# Patient Record
Sex: MALE
[De-identification: names, ages, dates, MRNs, and addresses within clinical notes are randomized per-mention and may not be internally consistent; named-entity substitution may affect disease eponyms.]

---

## 2022-04-06 ENCOUNTER — APPOINTMENT (OUTPATIENT)
Dept: ORTHOPEDIC SURGERY | Facility: CLINIC | Age: 87
End: 2022-04-06
Payer: MEDICARE

## 2022-04-06 VITALS — RESPIRATION RATE: 16 BRPM | WEIGHT: 180 LBS | HEIGHT: 70 IN | BODY MASS INDEX: 25.77 KG/M2

## 2022-04-06 DIAGNOSIS — Z87.39 PERSONAL HISTORY OF OTHER DISEASES OF THE MUSCULOSKELETAL SYSTEM AND CONNECTIVE TISSUE: ICD-10-CM

## 2022-04-06 DIAGNOSIS — Z78.9 OTHER SPECIFIED HEALTH STATUS: ICD-10-CM

## 2022-04-06 DIAGNOSIS — Z80.9 FAMILY HISTORY OF MALIGNANT NEOPLASM, UNSPECIFIED: ICD-10-CM

## 2022-04-06 PROBLEM — Z00.00 ENCOUNTER FOR PREVENTIVE HEALTH EXAMINATION: Status: ACTIVE | Noted: 2022-04-06

## 2022-04-06 PROCEDURE — 73130 X-RAY EXAM OF HAND: CPT | Mod: 50

## 2022-04-06 PROCEDURE — 20550 NJX 1 TENDON SHEATH/LIGAMENT: CPT | Mod: F7

## 2022-04-06 PROCEDURE — 99203 OFFICE O/P NEW LOW 30 MIN: CPT

## 2022-04-06 RX ORDER — LEVOTHYROXINE SODIUM 137 UG/1
TABLET ORAL
Refills: 0 | Status: ACTIVE | COMMUNITY

## 2022-04-06 RX ORDER — DIAZEPAM 5 MG/1
TABLET ORAL
Refills: 0 | Status: ACTIVE | COMMUNITY

## 2022-04-06 RX ORDER — FAMOTIDINE 10 MG/1
TABLET, FILM COATED ORAL
Refills: 0 | Status: ACTIVE | COMMUNITY

## 2022-04-06 RX ORDER — RABEPRAZOLE SODIUM 20 MG/1
TABLET, DELAYED RELEASE ORAL
Refills: 0 | Status: ACTIVE | COMMUNITY

## 2022-04-06 NOTE — PHYSICAL EXAM
[de-identified] : Physical exam shows the patient to be alert and oriented x3, capable of ambulation. The patient is well-developed and well-nourished in no apparent respiratory distress. Majority of the skin is intact bilaterally in the upper extremities without lymphadenopathy at the elbows.\par \par There is tenderness over the bilateral basal joint with a positive grind test and shoulder deformity. There is a positive crank test. There is swelling appreciated over the affected CMC joint which is not present on the opposite side. There is no evidence of infection or lymphadenopathy bilaterally to the level of the elbows There is no evidence of MCP hyperextension bilaterally. There is a negative Finkelstein's test There is no tenderness over the A-1 pulleys bilaterally except for the right third which has tenderness with mild locking upon compression.  No tenderness of the MP PIP or DIP joints.. 5/5 strength bilaterally. \par \par The wrists have a symmetric and full range of motion bilaterally with no pain upon forced flexion, extension, pronation and supination. There is no tenderness over the scaphoid scapholunate region ligaments and no tenderness of the TFCC bilaterally. There is no tenderness of the pisotriquetral hamate hook. The second through fifth CMC his is stable and nontender bilaterally.\par There is a negative carpal tunnel compression test or Tinel's bilaterally.   [de-identified] : PA lateral oblique of both hands shows advanced generative changes with sclerosis and osteophytes of the thumb CMC joints greater than DIP joint symmetric bilaterally

## 2022-04-06 NOTE — ASSESSMENT
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  \par \par The risks benefits and alternatives of treatment were discussed ranging from conservative to aggressive forms of treatment.  This included (but was not limited to) pain, infection, subcutaneous atrophy, skin depigmentation, tendon rupture, recurrence, incomplete relief of symptoms, tissue loss etc...  They agreed to undergo the steroid injection. \par \par Under informed consent and sterile conditions, 1/2 cc of 2% plain lidocaine  and 1/2 cc of Kenalog 10 was precisely injected into the patient's finger.   A sterile Band-Aid was placed and a home program was elected over occupational therapy. \par \par  It is my hope that this significantly alleviates the patient's symptoms. If symptoms are not fully resolved in the next 4-6 weeks they were encouraged to return to the office for reevaluation. Patient may also consider other forms of conservative care or surgical decompression which was discussed in the office today.\par \par  If symptoms are resolved they can followup on a as-needed basis.

## 2022-04-06 NOTE — CONSULT LETTER
[Dear  ___] : Dear  [unfilled], [Consult Letter:] : I had the pleasure of evaluating your patient, [unfilled]. [Please see my note below.] : Please see my note below. [Consult Closing:] : Thank you very much for allowing me to participate in the care of this patient.  If you have any questions, please do not hesitate to contact me. [Sincerely,] : Sincerely, [FreeTextEntry3] : Elías Eddy M.D., FAAOS\par Co-Director\par [facility]\par \par

## 2022-04-06 NOTE — HISTORY OF PRESENT ILLNESS
[Right] : right hand dominant [FreeTextEntry1] : Patient presents with right 3rd possible trigger finger. Patient complains of clicking of the finger for the last 10 days. He denies any injury.

## 2023-10-10 ENCOUNTER — APPOINTMENT (OUTPATIENT)
Dept: ORTHOPEDIC SURGERY | Facility: CLINIC | Age: 88
End: 2023-10-10
Payer: MEDICARE

## 2023-10-10 VITALS — RESPIRATION RATE: 16 BRPM | WEIGHT: 180 LBS | BODY MASS INDEX: 25.77 KG/M2 | HEIGHT: 70 IN

## 2023-10-10 DIAGNOSIS — M65.331 TRIGGER FINGER, RIGHT MIDDLE FINGER: ICD-10-CM

## 2023-10-10 PROCEDURE — 20550 NJX 1 TENDON SHEATH/LIGAMENT: CPT | Mod: LT

## 2023-10-10 PROCEDURE — 99214 OFFICE O/P EST MOD 30 MIN: CPT | Mod: 25

## 2023-11-28 VITALS — HEIGHT: 70 IN | BODY MASS INDEX: 25.77 KG/M2 | RESPIRATION RATE: 16 BRPM | WEIGHT: 180 LBS

## 2023-11-29 ENCOUNTER — APPOINTMENT (OUTPATIENT)
Dept: ORTHOPEDIC SURGERY | Facility: CLINIC | Age: 88
End: 2023-11-29
Payer: MEDICARE

## 2023-11-29 PROCEDURE — 99214 OFFICE O/P EST MOD 30 MIN: CPT | Mod: 25

## 2023-11-29 PROCEDURE — 20550 NJX 1 TENDON SHEATH/LIGAMENT: CPT | Mod: RT

## 2024-03-11 ENCOUNTER — APPOINTMENT (OUTPATIENT)
Dept: ORTHOPEDIC SURGERY | Facility: CLINIC | Age: 89
End: 2024-03-11
Payer: MEDICARE

## 2024-03-11 VITALS — RESPIRATION RATE: 16 BRPM | WEIGHT: 180 LBS | BODY MASS INDEX: 25.77 KG/M2 | HEIGHT: 70 IN

## 2024-03-11 DIAGNOSIS — M65.342 TRIGGER FINGER, LEFT RING FINGER: ICD-10-CM

## 2024-03-11 PROCEDURE — 20550 NJX 1 TENDON SHEATH/LIGAMENT: CPT | Mod: LT

## 2024-03-11 PROCEDURE — 99214 OFFICE O/P EST MOD 30 MIN: CPT | Mod: 25

## 2024-03-11 NOTE — HISTORY OF PRESENT ILLNESS
[Right] : right hand dominant [FreeTextEntry1] : Patient here for recurrent left fourth trigger finger despite 1 previous injection that lasted between 4 and 5 months.  Patient does not recall the injection but it is in the middle from October 2023.  Patient does not recall getting an injection into the left fourth despite it being documented in our records.

## 2024-03-11 NOTE — PHYSICAL EXAM
[de-identified] : No evidence of atrophy or skin changes from previous injections  There is positive swelling and tenderness localized to the A1 pulley of the left fourth digit with locking upon compression of the pulley.. There is no evidence of infection. No tenderness of the MP, PIP or DIP joint and no evidence of instability bilaterally. The FDS FDP and extensor mechanism is intact without instability and with 5 over 5 strength bilaterally in the digits.  There is good capillary refill of the digits bilaterally.There is no masses or sensitivity over the median and ulnar nerves at the level of the wrist. There is a negative Tinel's and negative Phalen's sign bilaterally. The sensation is grossly intact bilaterally.

## 2024-03-11 NOTE — ASSESSMENT
[FreeTextEntry1] : Left fourth trigger finger which I believe is recurrent after 1 previous injection but patient does not recall previous injection.  The risks, benefits, alternatives and associated differential diagnosis was discussed with the patient. Options ranged from conservative care, therapy to surgical intervention were reviewed and all questions answered. Risks included incomplete resolution of symptoms, worsening of symptoms recurrence, tissue loss, functional loss and other risks associated with treatment of this condition Patient appeared to have an excellent understanding of the risks as well as differential diagnosis associated with this condition.  Elected to proceed with a second injection for the left fourth trigger finger.  After the area was cleaned with alcohol to reduce the risk of infection, a half a cc of Kenalog 10-1/2 a cc of 2% lidocaine was placed into the region. Hemostasis was obtained by direct pressure and a Band-Aid applied.  Left fourth tendon sheath  Home program was elected over formal therapy  It is hoped that this has a long-lasting beneficial therapeutic effect. If symptoms are not fully resolved by 4-6 weeks the patient was asked to return to my office for reevaluation. If symptoms are resolved they can return on an as-needed basis.

## 2024-04-23 ENCOUNTER — APPOINTMENT (OUTPATIENT)
Dept: ORTHOPEDIC SURGERY | Facility: CLINIC | Age: 89
End: 2024-04-23
Payer: MEDICARE

## 2024-04-23 VITALS — WEIGHT: 180 LBS | RESPIRATION RATE: 16 BRPM | BODY MASS INDEX: 25.77 KG/M2 | HEIGHT: 70 IN

## 2024-04-23 DIAGNOSIS — M79.645 PAIN IN LEFT FINGER(S): ICD-10-CM

## 2024-04-23 DIAGNOSIS — M65.351 TRIGGER FINGER, RIGHT LITTLE FINGER: ICD-10-CM

## 2024-04-23 PROCEDURE — 20550 NJX 1 TENDON SHEATH/LIGAMENT: CPT | Mod: RT

## 2024-04-23 PROCEDURE — 99214 OFFICE O/P EST MOD 30 MIN: CPT | Mod: 25

## 2024-04-23 NOTE — PHYSICAL EXAM
[de-identified] : There is no atrophy or skin changes from previous injection  There is tenderness over the A1 pulley of the right fifth digit with locking upon compression no tenderness of the MP PIP or DIP joint full range of motion of the digit with active equaling passive range of motion.  There is good capillary refill of the digits bilaterally.There is no masses or sensitivity over the median and ulnar nerves at the level of the wrist. There is a negative Tinel's and negative Phalen's sign bilaterally. The sensation is grossly intact bilaterally.

## 2024-04-23 NOTE — ASSESSMENT
[FreeTextEntry1] : Patient has a recurrent right fifth trigger finger after 1 previous injection 1 year ago.  The risks, benefits, alternatives and associated differential diagnosis was discussed with the patient. Options ranged from conservative care, therapy to surgical intervention were reviewed and all questions answered. Risks included incomplete resolution of symptoms, worsening of symptoms recurrence, tissue loss, functional loss and other risks associated with treatment of this condition Patient appeared to have an excellent understanding of the risks as well as differential diagnosis associated with this condition.  He elected to proceed with an injection for the right fifth trigger finger  After the area was cleaned with alcohol to reduce the risk of infection, a half a cc of Kenalog 10-1/2 a cc of 2% lidocaine was placed into the region. Hemostasis was obtained by direct pressure and a Band-Aid applied.  Right fifth tendon sheath  It is hoped that this has a long-lasting beneficial therapeutic effect. If symptoms are not fully resolved by 4-6 weeks the patient was asked to return to my office for reevaluation. If symptoms are resolved they can return on an as-needed basis.

## 2024-04-23 NOTE — HISTORY OF PRESENT ILLNESS
[Right] : right hand dominant [FreeTextEntry1] : Patient returns for follow up of right small finger and left thumb pain worsening within the past few days.  No new trauma or fall.  Patient had a right fifth trigger finger injection on November 29, 2023.  Patient received a right third trigger finger injection on April 6, 2022.   He had a left fourth trigger finger with 2 previous injections. The first injection on 10/10/23 lasted between 4 and 5 months and the second injection on 3/11/24 still giving him relief.

## 2024-07-15 ENCOUNTER — APPOINTMENT (OUTPATIENT)
Dept: ORTHOPEDIC SURGERY | Facility: CLINIC | Age: 89
End: 2024-07-15

## 2024-07-15 VITALS — HEIGHT: 70 IN | WEIGHT: 180 LBS | BODY MASS INDEX: 25.77 KG/M2 | RESPIRATION RATE: 16 BRPM

## 2024-07-15 DIAGNOSIS — M65.342 TRIGGER FINGER, LEFT RING FINGER: ICD-10-CM

## 2024-07-15 DIAGNOSIS — M65.341 TRIGGER FINGER, RIGHT RING FINGER: ICD-10-CM

## 2024-07-15 DIAGNOSIS — M65.331 TRIGGER FINGER, RIGHT MIDDLE FINGER: ICD-10-CM

## 2024-07-15 DIAGNOSIS — G56.02 CARPAL TUNNEL SYNDROME, LEFT UPPER LIMB: ICD-10-CM

## 2024-07-15 DIAGNOSIS — M65.332 TRIGGER FINGER, LEFT MIDDLE FINGER: ICD-10-CM

## 2024-07-15 PROCEDURE — 20550 NJX 1 TENDON SHEATH/LIGAMENT: CPT | Mod: RT

## 2024-07-15 PROCEDURE — 99214 OFFICE O/P EST MOD 30 MIN: CPT | Mod: 25
